# Patient Record
(demographics unavailable — no encounter records)

---

## 2017-02-16 NOTE — MM
Reason for exam: screening  (asymptomatic).

Last mammogram was performed 1 year ago.



History:

Patient is postmenopausal.

Family history of premenopausal breast cancer in mother at age 32.

Benign US breast aspiration single RT of the right breast, February 15, 2016.  

Benign left mammotome panel of the left breast, January 12, 2007.



Physical Findings:

A clinical breast exam by your physician is recommended on an annual basis and 

results should be correlated with mammographic findings.



MG Screening Mammo w CAD

Bilateral CC and MLO view(s) were taken.

Prior study comparison: February 15, 2016, right breast MG diagnostic mammo RT wo 

CAD.  January 20, 2016, bilateral MG 3d screening mammo w/cad.

There are scattered fibroglandular densities.

Finding: There are typically benign grouped/clustered calcifications in the left 

breast. Previous ultrasound biopsy in the right and left breast.

Increase in number of calcifications since February 15, 2016 and January 20, 2016.





ASSESSMENT: Probably benign, BI-RAD 3



RECOMMENDATION:

Follow-up diagnostic mammogram of the left breast in 6 months.

## 2017-08-18 NOTE — MM
Reason for exam: follow-up at short interval from prior study.

Last mammogram was performed 6 months ago.



History:

Patient is postmenopausal.

Family history of premenopausal breast cancer in mother at age 32.

Benign US breast aspiration single RT of the right breast, February 15, 2016.  

Benign left mammotome panel of the left breast, January 12, 2007.



Physical Findings:

Nurse did not find any significant physical abnormalities on exam.



MG Diagnostic Mammo LT w CAD

CC, MLO, ML, CC with magnification, and ML with magnification view(s) were taken 

of the left breast.

Prior study comparison: February 15, 2017, bilateral MG screening mammo w CAD.  

January 20, 2016, bilateral MG 3d screening mammo w/cad.

The breast tissue is heterogeneously dense. This may lower the sensitivity of 

mammography.  On magnification views scattered benign appearing calcifications, no

suspicious groups.



These results were verbally communicated with the patient and result sheet given 

to the patient on 8/18/17.





ASSESSMENT: Benign, BI-RAD 2



RECOMMENDATION:

Return to routine screening mammogram schedule for both breasts.

Back on schedule for February 2018.

## 2018-02-19 NOTE — MM
Reason for exam: screening  (asymptomatic).

Last mammogram was performed 6 months ago.



History:

Patient is postmenopausal.

Family history of premenopausal breast cancer in mother at age 32.

Benign US breast aspiration single RT of the right breast, February 15, 2016.  

Benign left mammotome panel of the left breast, January 12, 2007.



Physical Findings:

A clinical breast exam by your physician is recommended on an annual basis and 

results should be correlated with mammographic findings.



MG Screening Mammo w CAD

Bilateral CC and MLO view(s) were taken.

Prior study comparison: August 18, 2017, left breast MG diagnostic mammo LT w CAD.

February 15, 2017, bilateral MG screening mammo w CAD.

The breast tissue is heterogeneously dense. This may lower the sensitivity of 

mammography.

Finding: There are typically benign round calcifications in both breasts. Previous

mammotome biopsy in the right and left breast. There is no discrete abnormality.





ASSESSMENT: Benign, BI-RAD 2



RECOMMENDATION:

Routine screening mammogram of both breasts in 1 year.

## 2019-03-08 NOTE — MM
Reason for exam: screening  (asymptomatic).

Last mammogram was performed 1 year and 1 month ago.



History:

Patient is postmenopausal.

Family history of breast cancer in maternal aunt and premenopausal breast cancer 

in mother at age 32.

Benign US breast aspiration single RT of the right breast, February 15, 2016.  

Benign left mammotome panel of the left breast, January 12, 2007.



Physical Findings:

A clinical breast exam by your physician is recommended on an annual basis and 

results should be correlated with mammographic findings.



MG Screening Mammo w CAD

Bilateral CC and MLO view(s) were taken.

Prior study comparison: February 16, 2018, bilateral MG screening mammo w CAD.  

August 18, 2017, left breast MG diagnostic mammo LT w CAD.

The breast tissue is heterogeneously dense. This may lower the sensitivity of 

mammography.  No suspicious abnormality. Bilateral biopsy markers noted.





ASSESSMENT: Negative, BI-RAD 1



RECOMMENDATION:

Routine screening mammogram of both breasts in 1 year.

## 2020-03-11 NOTE — MM
Reason for exam: screening  (asymptomatic).

Last mammogram was performed 1 year ago.



History:

Patient is postmenopausal.

Family history of breast cancer in maternal aunt and premenopausal breast cancer 

in mother at age 32.

Benign US breast aspiration single RT of the right breast, February 15, 2016.  

Benign left mammotome panel of the left breast, January 12, 2007.



Physical Findings:

A clinical breast exam by your physician is recommended on an annual basis and 

results should be correlated with mammographic findings.



MG Screening Mammo w CAD

Bilateral CC and MLO view(s) were taken.

Prior study comparison: March 7, 2019, bilateral MG screening mammo w CAD.  

February 16, 2018, bilateral MG screening mammo w CAD.

The breast tissue is heterogeneously dense. This may lower the sensitivity of 

mammography.  There is a 7mm spiculated left upper outer quadrant anterior depth 

mass. Superior right middle depth asymmetry. Bilateral biopsy markers noted.





ASSESSMENT: Incomplete: need additional imaging evaluation, BI-RAD 0



RECOMMENDATION:

Special view mammogram of both breasts.



If lesion persists on supplemental views, image directed ultrasound is 

recommended.



[facility] will attempt to contact patient to return for supplemental 

views and ultrasound if indicated.

## 2020-03-20 NOTE — USB
Reason for exam: additional evaluation requested from abnormal screening.



History:

Patient is postmenopausal.

Family history of breast cancer in maternal aunt and premenopausal breast cancer 

in mother at age 32.

Benign US breast aspiration single RT of the right breast, February 15, 2016.  

Benign left mammotome panel of the left breast, January 12, 2007.



US Breast Workup Limited LT

Left limited breast ultrasound including focal area of concern, retroareolar and 

axilla demonstrates a 0.9 x 0.8 x 0.7cm irregular, solid lesion at 1 o'clock. No 

suspicious left axillary adenopathy.



These results were verbally communicated with the patient and result sheet given 

to the patient on 3/20/20.





ASSESSMENT: Highly suggestive of malignancy, BI-RAD 5



RECOMMENDATION:

Ultrasound core biopsy of the left breast.



Called Dr. Paniagua's office with mammographic findings and has scheduled an 

appointment for the patient for 4/17/20 at 3:00 with Dr. Lindquist.

Biopsy scheduled for 4/20/20 at 8:00.



PRELIMINARY REPORT CALLED AND FAXED TO DR. LINDQUIST ON 3/20/20.

## 2020-03-20 NOTE — MM
Reason for exam: additional evaluation requested from abnormal screening.

Last mammogram was performed less than 1 month ago.



History:

Patient is postmenopausal.

Family history of breast cancer in maternal aunt and premenopausal breast cancer 

in mother at age 32.

Benign US breast aspiration single RT of the right breast, February 15, 2016.  

Benign left mammotome panel of the left breast, January 12, 2007.



Physical Findings:

Nurse did not find any significant physical abnormalities on exam.



MG Work Up Mamm w CAD BILAT

Bilateral spot compression MLO and LM view(s) were taken.  Spot compression CC 

view(s) were taken of the left breast.

Prior study comparison: March 10, 2020, bilateral MG screening mammo w CAD.  March 7, 2019, bilateral MG screening mammo w CAD.

The breast tissue is heterogeneously dense. This may lower the sensitivity of 

mammography.  There is a spiculated 1.0 x 0.9cm left upper outer quadrant mass 4cm

from nipple. Benign appearing calcifications in the left breast. The previously 

seen abnormality resolves on additional views and appears as fibroglandular tissue

compatible with summation on the right breast. Left biopsy marker noted.



These results were verbally communicated with the patient and result sheet given 

to the patient on 3/20/20.





ASSESSMENT: Incomplete: need additional imaging evaluation, BI-RAD 0



RECOMMENDATION:

Ultrasound of the left breast.